# Patient Record
Sex: MALE | Race: WHITE | NOT HISPANIC OR LATINO | Employment: UNEMPLOYED | URBAN - METROPOLITAN AREA
[De-identification: names, ages, dates, MRNs, and addresses within clinical notes are randomized per-mention and may not be internally consistent; named-entity substitution may affect disease eponyms.]

---

## 2020-06-17 VITALS
SYSTOLIC BLOOD PRESSURE: 130 MMHG | DIASTOLIC BLOOD PRESSURE: 81 MMHG | TEMPERATURE: 98.1 F | HEIGHT: 68 IN | BODY MASS INDEX: 29.64 KG/M2 | HEART RATE: 100 BPM | WEIGHT: 195.6 LBS

## 2020-06-17 DIAGNOSIS — S83.512A TEARS OF MENISCUS AND ACL OF LEFT KNEE, INITIAL ENCOUNTER: Primary | ICD-10-CM

## 2020-06-17 DIAGNOSIS — S83.207A TEARS OF MENISCUS AND ACL OF LEFT KNEE, INITIAL ENCOUNTER: Primary | ICD-10-CM

## 2020-06-17 PROCEDURE — 99244 OFF/OP CNSLTJ NEW/EST MOD 40: CPT | Performed by: ORTHOPAEDIC SURGERY
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
## 2020-06-28 DIAGNOSIS — S83.207A TEARS OF MENISCUS AND ACL OF LEFT KNEE, INITIAL ENCOUNTER: ICD-10-CM

## 2020-06-28 DIAGNOSIS — Z11.59 SCREENING FOR VIRAL DISEASE: ICD-10-CM

## 2020-06-28 DIAGNOSIS — S83.512A TEARS OF MENISCUS AND ACL OF LEFT KNEE, INITIAL ENCOUNTER: ICD-10-CM

## 2020-06-28 PROCEDURE — U0003 INFECTIOUS AGENT DETECTION BY NUCLEIC ACID (DNA OR RNA); SEVERE ACUTE RESPIRATORY SYNDROME CORONAVIRUS 2 (SARS-COV-2) (CORONAVIRUS DISEASE [COVID-19]), AMPLIFIED PROBE TECHNIQUE, MAKING USE OF HIGH THROUGHPUT TECHNOLOGIES AS DESCRIBED BY CMS-2020-01-R: HCPCS

## 2020-06-30 RX ORDER — ACETAMINOPHEN 325 MG/1
650 TABLET ORAL EVERY 6 HOURS PRN
COMMUNITY
End: 2020-12-11

## 2020-06-30 NOTE — PRE-PROCEDURE INSTRUCTIONS
My Surgical Experience    The following information was developed to assist you to prepare for your operation  What do I need to do before coming to the hospital?   Arrange for a responsible person to drive you to and from the hospital    Arrange care for your children at home  Children are not allowed in the recovery areas of the hospital   Plan to wear clothing that is easy to put on and take off  If you are having shoulder surgery, wear a shirt that buttons or zippers in the front  Bathing  o Shower the evening before and the morning of your surgery with an antibacterial soap  Please refer to the Pre Op Showering Instructions for Surgery Patients Sheet   o Remove nail polish and all body piercing jewelry  o Do not shave any body part for at least 24 hours before surgery-this includes face, arms, legs and upper body  Food  o Nothing to eat or drink after midnight the night before your surgery  This includes candy and chewing gum  o Exception: If your surgery is after 12:00pm (noon), you may have clear liquids such as 7-Up®, ginger ale, apple or cranberry juice, Jell-O®, water, or clear broth until 8:00 am  o Do not drink milk or juice with pulp on the morning before surgery  o Do not drink alcohol 24 hours before surgery  Medicine  o Follow instructions you received from your surgeon about which medicines you may take on the day of surgery  o If instructed to take medicine on the morning of surgery, take pills with just a small sip of water  Call your prescribing doctor for specific infroamtion on what to do if you take insulin    What should I bring to the hospital?    Bring:  Floydene Amour or a walker, if you have them, for foot or knee surgery   A list of the daily medicines, vitamins, minerals, herbals and nutritional supplements you take   Include the dosages of medicines and the time you take them each day   Glasses, dentures or hearing aids   Minimal clothing; you will be wearing hospital sleepwear   Photo ID; required to verify your identity   If you have a Living Will or Power of , bring a copy of the documents   If you have an ostomy, bring an extra pouch and any supplies you use    Do not bring   Medicines or inhalers   Money, valuables or jewelry    What other information should I know about the day of surgery?  Notify your surgeons if you develop a cold, sore throat, cough, fever, rash or any other illness   Report to the Ambulatory Surgical/Same Day Surgery Unit   You will be instructed to stop at Registration only if you have not been pre-registered   Inform your  fi they do not stay that they will be asked by the staff to leave a phone number where they can be reached   Be available to be reached before surgery  In the event the operating room schedule changes, you may be asked to come in earlier or later than expected    *It is important to tell your doctor and others involved in your health care if you are taking or have been taking any non-prescription drugs, vitamins, minerals, herbals or other nutritional supplements  Any of these may interact with some food or medicines and cause a reaction      Pre-Surgery Instructions:   Medication Instructions    acetaminophen (TYLENOL) 325 mg tablet Instructed patient per Anesthesia Guidelines

## 2020-07-01 ENCOUNTER — ANESTHESIA EVENT (OUTPATIENT)
Dept: PERIOP | Facility: HOSPITAL | Age: 25
End: 2020-07-01
Payer: COMMERCIAL

## 2020-07-01 LAB — SARS-COV-2 RNA SPEC QL NAA+PROBE: NOT DETECTED

## 2020-07-02 ENCOUNTER — ANESTHESIA (OUTPATIENT)
Dept: PERIOP | Facility: HOSPITAL | Age: 25
End: 2020-07-02
Payer: COMMERCIAL

## 2020-07-02 ENCOUNTER — HOSPITAL ENCOUNTER (OUTPATIENT)
Facility: HOSPITAL | Age: 25
Setting detail: OUTPATIENT SURGERY
Discharge: HOME | End: 2020-07-02
Attending: ORTHOPAEDIC SURGERY | Admitting: ORTHOPAEDIC SURGERY
Payer: COMMERCIAL

## 2020-07-02 VITALS
DIASTOLIC BLOOD PRESSURE: 78 MMHG | BODY MASS INDEX: 29.13 KG/M2 | OXYGEN SATURATION: 95 % | TEMPERATURE: 97.6 F | SYSTOLIC BLOOD PRESSURE: 145 MMHG | RESPIRATION RATE: 18 BRPM | WEIGHT: 192.2 LBS | HEART RATE: 100 BPM | HEIGHT: 68 IN

## 2020-07-02 DIAGNOSIS — S83.207D TEARS OF MENISCUS AND ACL OF LEFT KNEE, SUBSEQUENT ENCOUNTER: Primary | ICD-10-CM

## 2020-07-02 DIAGNOSIS — S83.512D TEARS OF MENISCUS AND ACL OF LEFT KNEE, SUBSEQUENT ENCOUNTER: Primary | ICD-10-CM

## 2020-07-02 PROCEDURE — 29881 ARTHRS KNE SRG MNISECTMY M/L: CPT | Performed by: ORTHOPAEDIC SURGERY

## 2020-07-02 PROCEDURE — 29881 ARTHRS KNE SRG MNISECTMY M/L: CPT | Performed by: PHYSICIAN ASSISTANT

## 2020-07-02 PROCEDURE — C9290 INJ, BUPIVACAINE LIPOSOME: HCPCS | Performed by: ANESTHESIOLOGY

## 2020-07-02 PROCEDURE — 29888 ARTHRS AID ACL RPR/AGMNTJ: CPT | Performed by: ORTHOPAEDIC SURGERY

## 2020-07-02 PROCEDURE — C1713 ANCHOR/SCREW BN/BN,TIS/BN: HCPCS | Performed by: ORTHOPAEDIC SURGERY

## 2020-07-02 PROCEDURE — 29888 ARTHRS AID ACL RPR/AGMNTJ: CPT | Performed by: PHYSICIAN ASSISTANT

## 2020-07-02 DEVICE — TIGHTROPE ABS BUTTON RND CONCAVE 14MM: Type: IMPLANTABLE DEVICE | Site: KNEE | Status: FUNCTIONAL

## 2020-07-02 DEVICE — IMPLANT TIGHTROPE ACL W/FIBERTAG RT: Type: IMPLANTABLE DEVICE | Site: KNEE | Status: FUNCTIONAL

## 2020-07-02 DEVICE — IMPLANT TIGHTROPE ACL W/FIBERTAG ABS: Type: IMPLANTABLE DEVICE | Site: KNEE | Status: FUNCTIONAL

## 2020-07-02 RX ORDER — CEFAZOLIN SODIUM 2 G/50ML
SOLUTION INTRAVENOUS AS NEEDED
Status: DISCONTINUED | OUTPATIENT
Start: 2020-07-02 | End: 2020-07-02 | Stop reason: SURG

## 2020-07-02 RX ORDER — ONDANSETRON 2 MG/ML
4 INJECTION INTRAMUSCULAR; INTRAVENOUS ONCE AS NEEDED
Status: DISCONTINUED | OUTPATIENT
Start: 2020-07-02 | End: 2020-07-02 | Stop reason: HOSPADM

## 2020-07-02 RX ORDER — CEFAZOLIN SODIUM 2 G/50ML
2000 SOLUTION INTRAVENOUS ONCE
Status: DISCONTINUED | OUTPATIENT
Start: 2020-07-02 | End: 2020-07-02 | Stop reason: HOSPADM

## 2020-07-02 RX ORDER — ONDANSETRON 2 MG/ML
INJECTION INTRAMUSCULAR; INTRAVENOUS AS NEEDED
Status: DISCONTINUED | OUTPATIENT
Start: 2020-07-02 | End: 2020-07-02 | Stop reason: SURG

## 2020-07-02 RX ORDER — DEXAMETHASONE SODIUM PHOSPHATE 10 MG/ML
INJECTION, SOLUTION INTRAMUSCULAR; INTRAVENOUS AS NEEDED
Status: DISCONTINUED | OUTPATIENT
Start: 2020-07-02 | End: 2020-07-02 | Stop reason: SURG

## 2020-07-02 RX ORDER — OXYCODONE HYDROCHLORIDE AND ACETAMINOPHEN 5; 325 MG/1; MG/1
1 TABLET ORAL EVERY 4 HOURS PRN
Qty: 15 TABLET | Refills: 0 | Status: SHIPPED | OUTPATIENT
Start: 2020-07-02 | End: 2020-12-11

## 2020-07-02 RX ORDER — BUPIVACAINE HYDROCHLORIDE 2.5 MG/ML
INJECTION, SOLUTION EPIDURAL; INFILTRATION; INTRACAUDAL
Status: DISCONTINUED | OUTPATIENT
Start: 2020-07-02 | End: 2020-07-02 | Stop reason: SURG

## 2020-07-02 RX ORDER — FENTANYL CITRATE 50 UG/ML
INJECTION, SOLUTION INTRAMUSCULAR; INTRAVENOUS AS NEEDED
Status: DISCONTINUED | OUTPATIENT
Start: 2020-07-02 | End: 2020-07-02 | Stop reason: SURG

## 2020-07-02 RX ORDER — HYDROMORPHONE HCL/PF 1 MG/ML
0.2 SYRINGE (ML) INJECTION
Status: DISCONTINUED | OUTPATIENT
Start: 2020-07-02 | End: 2020-07-02 | Stop reason: HOSPADM

## 2020-07-02 RX ORDER — CEPHALEXIN 500 MG/1
500 CAPSULE ORAL EVERY 6 HOURS SCHEDULED
Qty: 4 CAPSULE | Refills: 0 | Status: SHIPPED | OUTPATIENT
Start: 2020-07-02 | End: 2020-07-03

## 2020-07-02 RX ORDER — MAGNESIUM HYDROXIDE 1200 MG/15ML
LIQUID ORAL AS NEEDED
Status: DISCONTINUED | OUTPATIENT
Start: 2020-07-02 | End: 2020-07-02 | Stop reason: HOSPADM

## 2020-07-02 RX ORDER — KETOROLAC TROMETHAMINE 30 MG/ML
INJECTION, SOLUTION INTRAMUSCULAR; INTRAVENOUS AS NEEDED
Status: DISCONTINUED | OUTPATIENT
Start: 2020-07-02 | End: 2020-07-02 | Stop reason: SURG

## 2020-07-02 RX ORDER — MIDAZOLAM HYDROCHLORIDE 2 MG/2ML
INJECTION, SOLUTION INTRAMUSCULAR; INTRAVENOUS AS NEEDED
Status: DISCONTINUED | OUTPATIENT
Start: 2020-07-02 | End: 2020-07-02 | Stop reason: SURG

## 2020-07-02 RX ORDER — PROPOFOL 10 MG/ML
INJECTION, EMULSION INTRAVENOUS AS NEEDED
Status: DISCONTINUED | OUTPATIENT
Start: 2020-07-02 | End: 2020-07-02 | Stop reason: SURG

## 2020-07-02 RX ORDER — LIDOCAINE HYDROCHLORIDE 10 MG/ML
INJECTION, SOLUTION EPIDURAL; INFILTRATION; INTRACAUDAL; PERINEURAL AS NEEDED
Status: DISCONTINUED | OUTPATIENT
Start: 2020-07-02 | End: 2020-07-02 | Stop reason: SURG

## 2020-07-02 RX ORDER — MEPERIDINE HYDROCHLORIDE 25 MG/ML
12.5 INJECTION INTRAMUSCULAR; INTRAVENOUS; SUBCUTANEOUS ONCE
Status: COMPLETED | OUTPATIENT
Start: 2020-07-02 | End: 2020-07-02

## 2020-07-02 RX ORDER — KETAMINE HCL IN NACL, ISO-OSM 100MG/10ML
SYRINGE (ML) INJECTION AS NEEDED
Status: DISCONTINUED | OUTPATIENT
Start: 2020-07-02 | End: 2020-07-02 | Stop reason: SURG

## 2020-07-02 RX ORDER — SODIUM CHLORIDE, SODIUM LACTATE, POTASSIUM CHLORIDE, CALCIUM CHLORIDE 600; 310; 30; 20 MG/100ML; MG/100ML; MG/100ML; MG/100ML
125 INJECTION, SOLUTION INTRAVENOUS CONTINUOUS
Status: DISCONTINUED | OUTPATIENT
Start: 2020-07-02 | End: 2020-07-02 | Stop reason: HOSPADM

## 2020-07-02 RX ORDER — GLYCOPYRROLATE 0.2 MG/ML
INJECTION INTRAMUSCULAR; INTRAVENOUS AS NEEDED
Status: DISCONTINUED | OUTPATIENT
Start: 2020-07-02 | End: 2020-07-02 | Stop reason: SURG

## 2020-07-02 RX ORDER — FENTANYL CITRATE/PF 50 MCG/ML
50 SYRINGE (ML) INJECTION
Status: DISCONTINUED | OUTPATIENT
Start: 2020-07-02 | End: 2020-07-02 | Stop reason: HOSPADM

## 2020-07-02 RX ORDER — PROPOFOL 10 MG/ML
INJECTION, EMULSION INTRAVENOUS CONTINUOUS PRN
Status: DISCONTINUED | OUTPATIENT
Start: 2020-07-02 | End: 2020-07-02 | Stop reason: SURG

## 2020-07-02 RX ADMIN — Medication 20 MG: at 11:28

## 2020-07-02 RX ADMIN — KETOROLAC TROMETHAMINE 30 MG: 30 INJECTION, SOLUTION INTRAMUSCULAR at 13:00

## 2020-07-02 RX ADMIN — SODIUM CHLORIDE, SODIUM LACTATE, POTASSIUM CHLORIDE, AND CALCIUM CHLORIDE: .6; .31; .03; .02 INJECTION, SOLUTION INTRAVENOUS at 12:46

## 2020-07-02 RX ADMIN — LIDOCAINE HYDROCHLORIDE 50 MG: 10 INJECTION, SOLUTION EPIDURAL; INFILTRATION; INTRACAUDAL; PERINEURAL at 11:06

## 2020-07-02 RX ADMIN — SODIUM CHLORIDE, SODIUM LACTATE, POTASSIUM CHLORIDE, AND CALCIUM CHLORIDE 125 ML/HR: .6; .31; .03; .02 INJECTION, SOLUTION INTRAVENOUS at 10:41

## 2020-07-02 RX ADMIN — MEPERIDINE HYDROCHLORIDE 12.5 MG: 25 INJECTION, SOLUTION INTRAMUSCULAR; INTRAVENOUS; SUBCUTANEOUS at 13:43

## 2020-07-02 RX ADMIN — FENTANYL CITRATE 50 MCG: 50 INJECTION, SOLUTION INTRAMUSCULAR; INTRAVENOUS at 13:08

## 2020-07-02 RX ADMIN — MIDAZOLAM HYDROCHLORIDE 1 MG: 1 INJECTION, SOLUTION INTRAMUSCULAR; INTRAVENOUS at 10:57

## 2020-07-02 RX ADMIN — FENTANYL CITRATE 50 MCG: 50 INJECTION, SOLUTION INTRAMUSCULAR; INTRAVENOUS at 11:24

## 2020-07-02 RX ADMIN — GLYCOPYRROLATE 0.1 MG: 0.2 INJECTION, SOLUTION INTRAMUSCULAR; INTRAVENOUS at 11:05

## 2020-07-02 RX ADMIN — PROPOFOL 100 MG: 10 INJECTION, EMULSION INTRAVENOUS at 11:08

## 2020-07-02 RX ADMIN — PROPOFOL 100 MCG/KG/MIN: 10 INJECTION, EMULSION INTRAVENOUS at 11:10

## 2020-07-02 RX ADMIN — CEFAZOLIN SODIUM 2000 MG: 2 SOLUTION INTRAVENOUS at 11:01

## 2020-07-02 RX ADMIN — ONDANSETRON 4 MG: 2 INJECTION INTRAMUSCULAR; INTRAVENOUS at 11:41

## 2020-07-02 RX ADMIN — Medication 20 MG: at 11:06

## 2020-07-02 RX ADMIN — Medication 10 MG: at 11:56

## 2020-07-02 RX ADMIN — MIDAZOLAM HYDROCHLORIDE 1 MG: 1 INJECTION, SOLUTION INTRAMUSCULAR; INTRAVENOUS at 10:52

## 2020-07-02 RX ADMIN — PROPOFOL 200 MG: 10 INJECTION, EMULSION INTRAVENOUS at 11:06

## 2020-07-02 RX ADMIN — DEXAMETHASONE SODIUM PHOSPHATE 4 MG: 10 INJECTION, SOLUTION INTRAMUSCULAR; INTRAVENOUS at 11:06

## 2020-07-02 RX ADMIN — FENTANYL CITRATE 50 MCG: 50 INJECTION, SOLUTION INTRAMUSCULAR; INTRAVENOUS at 10:52

## 2020-07-02 RX ADMIN — PROPOFOL 100 MG: 10 INJECTION, EMULSION INTRAVENOUS at 11:07

## 2020-07-02 RX ADMIN — BUPIVACAINE HYDROCHLORIDE 10 ML: 2.5 INJECTION, SOLUTION EPIDURAL; INFILTRATION; INTRACAUDAL at 14:13

## 2020-07-02 NOTE — OP NOTE
OPERATIVE REPORT  PATIENT NAME: Elba Girard    :  1995  MRN: [id number]  Pt Location: [facility] OR ROOM 01    SURGERY DATE: 2020    Surgeon(s) and Role:     * Jenelle Abarca MD - Primary     * Lamont Friend PA-C - Assisting necessary for the procedure for assistance with minimally invasive arthroscopic techniques for ACL reconstruction and medial meniscectomy for assistance with graft harvesting as well as assistance with graft preparation and implantation well as assistance with medial meniscectomy for assistance with use of the camera and shaver and biter  Zay LUNDBERG  And OTC 2nd assistant    Preop Diagnosis:  Tears of meniscus and ACL of left knee, initial encounter [S83 207A, S83 512A]    Post-Op Diagnosis Codes:     * Tears of meniscus and ACL of left knee, initial encounter [S83 207A, S83 512A]    Procedure:  Left knee arthroscopy with anterior cruciate ligament reconstruction utilizing quadriceps tendon autograft and all inside technique from [employer] with tight rope and 2 buttons as well as medial meniscectomy    Specimen(s):  * No specimens in log *    Estimated Blood Loss:   70 mL    Drains:  * No LDAs found *    Anesthesia Type:   General w/ Regional    Operative Indications:  Tears of meniscus and ACL of left knee, initial encounter [S83 207A, S83 512A]  Mojgan is a 31-year-old male who has been suffering with left knee pain and instability  This has been chronic  He had a more recent injury that gave him more significant pain  He believes he has had a chronic ACL tear  An MRI did demonstrate a complete ACL tear as well as a complex medial meniscus tear  He understood the risks and benefits of left knee arthroscopy with ACL reconstruction utilizing quadriceps tendon autograft and medial meniscectomy and wished to go ahead    The risks are inclusive of but not limited to infection, stiffness, recurrent instability, blood clots, failure to regain full strength and ability, arthritis, failure of the operation provide anticipated results, and need for further surgery  Operative Findings:  Left knee exam under anesthesia demonstrated significant instability to Lachman's as well as anterior drawer being both grade 2A  Good stability to posterior drawer well as good stability to varus valgus stress range of motion 0-130 preoperatively  There was a significant effusion however and intra-articularly there were grade 3 changes along undersurface of patella but no loose bodies in either gutter  Lateral compartment was intact from articular cartilage and meniscal standpoint however the medial compartment demonstrated highly complex bucket-handle tear with significant tearing of the remnant meniscus in a broad region  There were radial and horizontal cleavage and longitudinal components  We did take a considerable amount of time to appropriately debride these tears back to a stable rim of meniscal tissue  The meniscus was severely damaged over this chronic instability course  There were also grade 3 changes along the medial femoral condyle  The ACL was completely disrupted off of the femur and had an empty wall sign with very little ligament remaining  We were able to have an anatomic reconstruction and had a very stable 9 mm graft at the end of the procedure with excellent range of motion and no impingement and good stability to Lachman's and anterior drawer and probing  Complications:   None    Procedure and Technique:  Mojgan was taken to the operating room and placed supine on the OR table  He was given preoperative IV antibiotics as well as preoperative regional anesthesia by the attending anesthesiologist   General anesthesia was induced in the left knee was taken through exam under anesthesia as described above  The left lower extremity was prepped and draped in usual sterile fashion  Surgical time-out was taken  We exsanguinated and inflated tourniquet    First tourniquet time was 16 minutes  We began with a longitudinal incision over the quadriceps tendon in its mid section with a 15 blade and carefully dissected down to the quadriceps tendon  The 9 mm blade was utilized to harvest the middle 3rd of the quadriceps tendon in standard fashion  We then whipstitched the graft with a FiberWire suture and were then able to complete the harvest utilizing the quadriceps tendon harvester device gaining approximately 70 mm of tendon  We placed the tendon on the back table and closed the interval of the quadriceps tendon with 0 Vicryl suture interrupted  We then prepared the graft on the back table on the graft master in standard fashion as the tourniquet was let down for the 1st time  We did have a 9 mm graft when measured on the back table that was 70 mm in length  We prepared in standard fashion for the quadriceps tendon autograft ACL reconstruction with the tight rope  We then turned our attention back to the knee for the arthroscopic portion of the procedure and exsanguinated once again and inflated the tourniquet  We did create an anterolateral portal with 11 blade and began diagnostic arthroscopy  There was a significant effusion in the knee  We also made an anteromedial portal with 11 blade and dilated that the fit the size 9 graft  We demonstrated articular cartilage damage only undersurface of the patella which was grade 3 but no loose bodies in either gutter  Lateral compartment was intact from articular cartilage and meniscal standpoint  ACL was obviously completely disrupted off the femur with very little remnant tissue remaining on the tibia  We also demonstrated a large bucket-handle and highly complex medial meniscus tear with meniscal remnant tears that were considerable and radial as well as horizontal cleavage in nature    We did take a considerable amount of time to perform a medial meniscectomy utilizing a series of biters and Santiago to carefully remove torn meniscal tissue but leave intact meniscal tissue  The posterior 3rd of the tear was certainly the worst portion of the tear although the meniscal root was intact posteriorly  We felt that we had a stable rim of meniscal tissue remaining at the end of this portion of the procedure  We had switched back and forth viewing from the anterolateral and anteromedial portals to gain better access the different portions of this highly complex and large tear pattern  We then turned our attention to preparation for placement of the ACL reconstruction graft  We did remove tissue along the tibia and femur where there was some small amount of remaining tissue left for the ACL but did leave tissue to identify anatomic landmarks for placement the sockets  We viewed from the anteromedial portal and placed the femoral socket guide set at 105°  We then made a small incision along the distal lateral thigh  This is with 11 blade  We did place a guidewire followed by the 9 mm reverse Reamer into the anatomic stump of the ACL on the femur just posterior to the midline along the lateral femoral condyle in the notch  We reverse reamed to a depth of approximately 28 mm  We did remove any floating debris from the joint with a shaver and then passed the FiberWire suture and retrieved that via the anterolateral portal and docked it there  We then viewed from the anterolateral portal and began preparation the tibial tunnel  We did make a small incision along the proximal anteromedial tibia with 11 blade and utilized the tibial tunnel guide set at 55°  We placed a guidewire in the center of the tibial stump in the anatomic location on the tibia in between the tibial spines along the anterior portion the lateral meniscus  We then placed the guidewire followed by the 9 mm reverse Reamer and reverse reamed through both cortices    We did remove any floating debris with a shaver and then passed the tiger wire suture and retrieved both the tiger wire and FiberWire sutures through the anteromedial portal   We then retrieved the graft on the back table and passed the femoral portion of the graft into the femoral socket delivering approximately 25 mm of graft into the femoral socket and observing the femoral button was flipped along the femur and sit very nicely  We then delivered the tibial portion of the graft into the tibial tunnel  This did sit very nicely  We ranged the knee and cycled approximately 20 times  We felt it was stable in its fixation on the femur  We then placed the knee into full extension and utilized the large tibial button and tensioned the graft with the tight rope sutures as well as the FiberWire sutures from preparation of the graft  We tied additional knots over the button with both sutures  We then tensioned the femoral portion once again and found that we were very stable on Lachman's as well as anterior drawer and probing of the ACL  After placing additional knots over femoral button, we removed excess suture and closed the percutaneous an arthroscopic portals utilizing 4-0 nylon suture and utilized 2-0 Vicryl and 4-0 Vicryl and skin glue for the quadriceps tendon harvest site  Dry, sterile dressings were applied with an Ace bandage and a brace in tourniquet was let down at 77 minutes  He tolerated transferred to recovery stable condition  He will follow up in approximately week for suture removal and will be on the ACL reconstruction rehabilitation protocol  He will be on aspirin for DVT prophylaxis  He will be on Keflex for 1 day postoperatively for antibiotic prophylaxis  He will have a CPM and quadriceps muscle stimulator       I was present for the entire procedure and A qualified resident physician was not available    Patient Disposition:  PACU     SIGNATURE: Norman Kam MD  DATE: July 2, 2020  TIME: 1:01 PM

## 2020-07-02 NOTE — ANESTHESIA POSTPROCEDURE EVALUATION
Post-Op Assessment Note    CV Status:  Stable  Pain Score: 0    Pain management: adequate     Mental Status:  Alert and awake   Hydration Status:  Euvolemic and stable   PONV Controlled:  Controlled   Airway Patency:  Patent   Post Op Vitals Reviewed: Yes      Staff: Anesthesiologist, CRNA   Comments: Report given to recopvering RN, VSS, Pt states he is comfortable          /72 (07/02/20 1325)    Temp (!) 97 4 °F (36 3 °C) (07/02/20 1325)    Pulse (!) 120 (07/02/20 1325)   Resp 16 (07/02/20 1325)    SpO2 99 % (07/02/20 1325)

## 2020-07-02 NOTE — INTERVAL H&P NOTE
H&P reviewed  After examining the patient I find no changes in the patients condition since the H&P had been written      Vitals:    07/02/20 1006   BP: 147/93   Pulse: (!) 116   Resp: 18   Temp: (!) 96 1 °F (35 6 °C)   SpO2: 99%

## 2020-07-02 NOTE — PERIOPERATIVE NURSING NOTE
Received from pacu  Awake and alert  Taking po fluids  Ice maintained to left knee  Left pedal pulse palpable  Parents at bedside

## 2020-07-02 NOTE — ANESTHESIA PREPROCEDURE EVALUATION
Review of Systems/Medical History  Patient summary reviewed  Chart reviewed      Cardiovascular  Negative cardio ROS    Pulmonary  Negative pulmonary ROS        GI/Hepatic  Negative GI/hepatic ROS          Negative  ROS        Endo/Other  Negative endo/other ROS      GYN  Negative gynecology ROS          Hematology  Negative hematology ROS      Musculoskeletal  Negative musculoskeletal ROS        Neurology  Negative neurology ROS      Psychology   Negative psychology ROS              Physical Exam    Airway    Mallampati score: II  TM Distance: >3 FB  Neck ROM: full     Dental   No notable dental hx     Cardiovascular  Comment: Negative ROS, Rhythm: regular, Rate: normal, Cardiovascular exam normal    Pulmonary  Pulmonary exam normal Breath sounds clear to auscultation,     Other Findings        Anesthesia Plan  ASA Score- 2     Anesthesia Type- regional and IV sedation with anesthesia with ASA Monitors  Additional Monitors:   Airway Plan:         Plan Factors-    Induction- intravenous  Postoperative Plan- Plan for postoperative opioid use  Informed Consent- Anesthetic plan and risks discussed with patient  I personally reviewed this patient with the CRNA  Discussed and agreed on the Anesthesia Plan with the CRNA  Pietro Messer

## 2020-07-02 NOTE — DISCHARGE INSTRUCTIONS
Instruction Following ACL Reconstruction    Crutches:  Use the crutches when walking as you were taught at the surgical center  Put as much weight on your leg as you can tolerate  When you feel comfortable walking without your crutches you may do so  This usually takes about 1-2 weeks  CPM (continuous passive motion) Machine:   You should use this machine for 2-3 hours at home twice daily for a total of 4-6 hours per day  The company will explain how to operate the CPM  Your goal with this machine is to achieve 95 degrees of flexion (bending) comfortably  Every time you use the CPM you should try to increase the flexion by 5 degrees  You will experience some discomfort while trying to increase your flexion  Once 95 degrees is obtained you may call the company to  the machine  Also remember to take off your brace while using it  Brace: The knee brace given to you immediately after surgery must by worn while walking and sleeping in locked extension (leg straight)  You may take the brace off when doing exercises and/or in the CPM machine  Brace hinges must be at the level of the knee cap  You may loosen or tighten the brace straps as necessary, but it should be snug  You will need to wear the brace for about 4-6 weeks  Dressing:   Remove all cotton and yellow gauze 48 hours after your surgery  If there are steri-strips (white paper strips) on your wound leave them in place until you see the doctor  Reapply ACE bandage or bandaids over incisions  You do not need to place a new dressing on your knee  Showering: You may shower 48 hours after surgery once the dressing has been removed, however you must place a plastic bag over the brace while showering or you have the option to take off the brace to shower  Whatever you decide to do please use CAUTION!! Be careful not to slip, twist, or fall  A stool placed in the shower so you can sit is a great idea so you can stabilize your knee   Do not soak in a bath tub, hot tub, or pool until the doctor tells you it is O K  to do so  Once you are done showering pat the wound dry  Elevation:   When you are not walking your leg should be straight with a pillow under your foot or ankle (not behind your knee)  Try to elevate knee as much as possible to reduce swelling  Ice:   You should ice the knee as often as possible (especially after exercise) to reduce swelling and discomfort  Do not ice the knee more than 20 minutes at a time  Let the knee warm up before reapplication  Avoid getting your wound wet  Aspirin:  Unless otherwise noted, take one 325mg aspirin daily for the first 3 weeks following surgery  This is to help decrease the risk of blood clots  Follow-up visit:   You need to see the doctor about one week following surgery for your first post-op visit  At that time your sutures (stitches) will be removed  You will be given a physical therapy prescription to begin physical therapy if you were not already given one  Common concerns: 1  Numbness around the incision site on the outside part of the knee is a result of a disruption of a superficial nerve during the operative procedure  Most of this will resolve over time but a small area the size of a quarter usually remains numb  This is unavoidable because of the proximity of the nerve to the incision  2  A sudden rush or feeling of fullness with pain when going from a sitting to a standing position in the knee is common after surgery  3  Bruising and/or swelling of the shin and ankle is common after surgery  This usually occurs 3-4 days after surgery  This is caused by bleeding from the bone (which is cut during surgery) into the area just below the skin  To relieve this discomfort it is best to ice the leg  If at any time you have discomfort, swelling, or redness in the calf (behind the leg between the knee and the ankle) please call the doctor immediately  Please call if:   1   If at any time you have discomfort, swelling, or redness in the calf (behind the leg between the knee and the ankle) please call the doctor immediately  2  Any oozing or redness of the wound, fevers (>101 3 degrees F), or chills  3  Any difficulty breathing or heaviness in the chest      REMEMBER - these are only guidelines for what to expect following ACL surgery  If you have any questions or concerns, please do not hesitate to call the office at any time [phone]

## 2020-07-02 NOTE — ANESTHESIA PROCEDURE NOTES
Peripheral Block    Patient location during procedure: pre-op  Start time: 7/2/2020 10:45 AM  Reason for block: procedure for pain, at surgeon's request and post-op pain management  Staffing  Anesthesiologist: Hilary Man MD  Performed: anesthesiologist   Preanesthetic Checklist  Completed: patient identified, site marked, surgical consent, pre-op evaluation, timeout performed, IV checked, risks and benefits discussed and monitors and equipment checked  Peripheral Block  Patient position: supine  Prep: ChloraPrep  Patient monitoring: heart rate, cardiac monitor, continuous pulse ox and frequent blood pressure checks  Block type: adductor canal block  Laterality: left  Injection technique: single-shot  Procedures: ultrasound guided, Ultrasound guidance required for the procedure to increase accuracy and safety of medication placement and decrease risk of complications    Ultrasound permanent image savedbupivacaine (MARCAINE) 0 25 % perineural infiltration, 10 mL (Exparel 20 ml)  Needle  Needle type: Stimuplex   Needle gauge: 22 G  Needle length: 5 cm  Needle localization: anatomical landmarks and ultrasound guidance  Needle insertion depth: 2 5 cm  Test dose: negative  Assessment  Injection assessment: incremental injection, local visualized surrounding nerve on ultrasound, negative aspiration for heme and no paresthesia on injection  Paresthesia pain: none  Heart rate change: no  Slow fractionated injection: yes  Post-procedure:  site cleaned  patient tolerated the procedure well with no immediate complications

## 2020-07-10 ENCOUNTER — OFFICE VISIT (OUTPATIENT)
Dept: OBGYN CLINIC | Facility: CLINIC | Age: 25
End: 2020-07-10

## 2020-07-10 VITALS
HEIGHT: 68 IN | TEMPERATURE: 96.6 F | DIASTOLIC BLOOD PRESSURE: 93 MMHG | WEIGHT: 190 LBS | SYSTOLIC BLOOD PRESSURE: 148 MMHG | BODY MASS INDEX: 28.79 KG/M2 | HEART RATE: 95 BPM

## 2020-07-10 DIAGNOSIS — Z98.890 S/P ACL RECONSTRUCTION: Primary | ICD-10-CM

## 2020-07-10 PROCEDURE — 99024 POSTOP FOLLOW-UP VISIT: CPT | Performed by: PHYSICIAN ASSISTANT

## 2020-07-10 NOTE — PROGRESS NOTES
Assessment/Plan:  1  S/P ACL reconstruction         The patient is doing well and will continue his brace locked in extension with weightbearing  He may unlock this while sitting  He will continue aspirin for DVT prophylaxis  He will call if he develops and calf pain or cramping as this could be indicative of DVT  He will continue his CPM and PT  He can wean from his crutches as tolerated  He will follow-up in 4 weeks for repeat evaluation  Subjective:   Aleyda Prado is a 25 y o  male who presents today for follow-up of his left knee, now about a week status post arthroscopic medial meniscectomy and ACL reconstruction with quadriceps tendon autograft  He notes improvement in his pain, but still notes some anterior and posterior knee pain  He has been ambulating with his brace locked in extension with use of his crutches for assistance  He is up to 62 degrees on his CPM  He has had 1 session of PT thus far  He denies any calf pain or cramping  He notes good sensation of the left lower extremity  Review of Systems      History reviewed  No pertinent past medical history      Past Surgical History:   Procedure Laterality Date    MI KNEE SCOPE,AID ANT CRUCIATE REPAIR Left 7/2/2020    Procedure: ARTHROSCOPIC RECONSTRUCTION ANTERIOR CRUCIATE LIGAMENT (ACL) WITH QUADRICEPS TENDON AUTOGRAFT AND MEDIAL MENISCAL REPAIR VERSUS MENISCECTOMY;  Surgeon: Pritesh Albright MD;  Location: [address] [place] [address];  Service: Orthopedics    MI KNEE SCOPE,MED OR LAT MENIS REPAIR Left 7/2/2020    Procedure: REPAIR MENISCUS;  Surgeon: Pritesh Albright MD;  Location: [facility] OR;  Service: Orthopedics    MI KNEE SCOPE,MED/LAT MENISECTOMY Left 7/2/2020    Procedure: Idalia Hollins;  Surgeon: Pritesh Albright MD;  Location: [facility];  Service: Orthopedics    WISDOM TOOTH EXTRACTION         Family History   Problem Relation Age of Onset    No Known Problems Mother     No Known Problems Father        Social History Occupational History    Not on file   Tobacco Use    Smoking status: Never Smoker    Smokeless tobacco: Never Used   Substance and Sexual Activity    Alcohol use: Never     Frequency: Never    Drug use: Never    Sexual activity: Not on file         Current Outpatient Medications:     acetaminophen (TYLENOL) 325 mg tablet, Take 650 mg by mouth every 6 (six) hours as needed for mild pain, Disp: , Rfl:     oxyCODONE-acetaminophen (PERCOCET) 5-325 mg per tablet, Take 1 tablet by mouth every 4 (four) hours as needed for moderate pain for up to 15 dosesMax Daily Amount: 6 tablets (Patient not taking: Reported on 7/10/2020), Disp: 15 tablet, Rfl: 0    No Known Allergies    Objective:  Vitals:    07/10/20 0902   BP: 148/93   Pulse: 95   Temp: (!) 96 6 °F (35 9 °C)       Ortho Exam    Physical Exam    Left knee: Sutures removed  Incisions CDI  No erythema  No calf or knee tenderness  ROM 0-50 degrees  Sensation intact

## 2020-07-21 NOTE — TELEPHONE ENCOUNTER
Mojgan called today asking for help with getting a refund for the polar cube  He has not been able to use the unit at all due to him not having the correct padding for it  I did send an email to Taye - our  - asking her to contact patient to discuss further with hopes to rectify this situation  I did speak with Mojgan and let him know that I would reach out to our rep to try to get some resolution

## 2020-08-07 VITALS — BODY MASS INDEX: 28.89 KG/M2 | HEIGHT: 68 IN | TEMPERATURE: 97.1 F

## 2020-08-07 DIAGNOSIS — Z98.890 S/P ACL RECONSTRUCTION: Primary | ICD-10-CM

## 2020-08-07 PROCEDURE — 99024 POSTOP FOLLOW-UP VISIT: CPT | Performed by: ORTHOPAEDIC SURGERY

## 2020-08-07 NOTE — PROGRESS NOTES
Assessment/Plan:  1  S/P ACL reconstruction         The patient is doing well and can discontinue his TROM brace at this point  He will continue PT on the ACL reconstruction protocol  He will still avoid any significant activity with his leg  He will follow-up in 6 weeks for repeat evaluation  Subjective:   Elba Girard is a 25 y o  male who presents today for follow-up of his left knee, now about 5 weeks status post ACL reconstruction with quadriceps tendon autograft and medial meniscectomy  He feels he is progressing with PT and notes improving ROM and strength  He notes good sensation of the left lower extremity  He denies any calf pain or cramping  Review of Systems      History reviewed  No pertinent past medical history      Past Surgical History:   Procedure Laterality Date    VA KNEE SCOPE,AID ANT CRUCIATE REPAIR Left 7/2/2020    Procedure: ARTHROSCOPIC RECONSTRUCTION ANTERIOR CRUCIATE LIGAMENT (ACL) WITH QUADRICEPS TENDON AUTOGRAFT AND MEDIAL MENISCAL REPAIR VERSUS MENISCECTOMY;  Surgeon: Jenelle Abarca MD;  Location: [address] [place] [address];  Service: Orthopedics    VA KNEE SCOPE,MED OR LAT [facility] Left 7/2/2020    Procedure: REPAIR MENISCUS;  Surgeon: Jenelle Abarca MD;  Location: [facility];  Service: Orthopedics    VA KNEE SCOPE,MED/LAT MENISECTOMY Left 7/2/2020    Procedure: Marco Orr;  Surgeon: Jenelle Abarca MD;  Location: [facility] OR;  Service: Orthopedics    WISDOM TOOTH EXTRACTION         Family History   Problem Relation Age of Onset    No Known Problems Mother     No Known Problems Father        Social History     Occupational History    Not on file   Tobacco Use    Smoking status: Never Smoker    Smokeless tobacco: Never Used   Substance and Sexual Activity    Alcohol use: Never     Frequency: Never    Drug use: Never    Sexual activity: Not on file         Current Outpatient Medications:     acetaminophen (TYLENOL) 325 mg tablet, Take 650 mg by mouth every 6 (six) hours as needed for mild pain, Disp: , Rfl:     oxyCODONE-acetaminophen (PERCOCET) 5-325 mg per tablet, Take 1 tablet by mouth every 4 (four) hours as needed for moderate pain for up to 15 dosesMax Daily Amount: 6 tablets (Patient not taking: Reported on 7/10/2020), Disp: 15 tablet, Rfl: 0    No Known Allergies    Objective:  Vitals:    08/07/20 0915   Temp: (!) 97 1 °F (36 2 °C)       Right Knee Exam     Tenderness   The patient is experiencing no tenderness  Range of Motion   Extension: 0   Flexion: 110     Tests   Lachman:  Anterior - negative      Drawer:  Anterior - negative    Posterior - negative    Other   Erythema: absent  Scars: present  Sensation: normal  Pulse: present  Swelling: none  Effusion: no effusion present          Observations     Right Knee   Negative for effusion  Physical Exam   Musculoskeletal:      Right knee: He exhibits no effusion

## 2020-09-18 VITALS
SYSTOLIC BLOOD PRESSURE: 125 MMHG | BODY MASS INDEX: 28.89 KG/M2 | TEMPERATURE: 96.7 F | DIASTOLIC BLOOD PRESSURE: 84 MMHG | HEIGHT: 68 IN | HEART RATE: 75 BPM

## 2020-09-18 DIAGNOSIS — S83.512A TEARS OF MENISCUS AND ACL OF LEFT KNEE, INITIAL ENCOUNTER: ICD-10-CM

## 2020-09-18 DIAGNOSIS — S83.207A TEARS OF MENISCUS AND ACL OF LEFT KNEE, INITIAL ENCOUNTER: ICD-10-CM

## 2020-09-18 DIAGNOSIS — Z98.890 S/P ACL RECONSTRUCTION: Primary | ICD-10-CM

## 2020-09-18 PROCEDURE — 99024 POSTOP FOLLOW-UP VISIT: CPT | Performed by: ORTHOPAEDIC SURGERY

## 2020-09-18 NOTE — PROGRESS NOTES
Assessment/Plan:  1  S/P ACL reconstruction  Ambulatory referral to Physical Therapy   2  Tears of meniscus and ACL of left knee, initial encounter  Ambulatory referral to Physical Therapy       Scribe Attestation    I,:   Sebastian Balderrama am acting as a scribe while in the presence of the attending physician :        I,:   Meryl Santamaria MD personally performed the services described in this documentation    as scribed in my presence :              Mojgan upon examination is doing well to have months status post left knee arthroscopy with anterior cruciate ligament reconstruction, utilizing quadriceps autograft, and medial meniscectomy  He demonstrates full range of motion and great stability on exam today  I did remark that the snapping at the lateral aspect of his knee is likely inflammation and should continue to improve as time goes on as well as the intermittent cracking medially  I did note to him that I would like him to refrain from any light jogging until at least 3 months status post   He may continue with his leg press and hamstring curl activities  No running activities for at least 1 more month  I would like him to continue with the anterior cruciate ligament reconstruction protocol with physical therapy  I did provide him a new prescription indicating this today  In regards to the scars from surgery he may use [employer] skin care 3 to 4 times a day over the next 3-4 months  I would like him to follow up with me in 6 weeks time for repeat clinical evaluation  Subjective:   Ankit King is a 25 y o  male who presents to the office today for follow-up evaluation of his left knee  He is now approximately 2 and half months status post left knee arthroscopy with anterior cruciate ligament reconstruction utilizing quadriceps autograft, and medial meniscectomy  States he is doing well overall and is only experiencing mild moderate sharp pain at the patellar tendon    He states that physical therapy is going well however it was quite difficult at the initial onset  States he denies any recurrent swelling, or bouts of instability into his knee  He does remark on intermittent cracking to the medial aspect of his knee however it is nonpainful and does not inhibit his range of motion or stability into his knee  He does also have complaints of intermittent snapping sensation at the lateral aspect of his knee at the lateral incision  Again he denies that it is painful however does remark on it during flexion extension exercises  Today he denies any distal paresthesias  Review of Systems   Constitutional: Negative for chills, fever and unexpected weight change  HENT: Negative for hearing loss, nosebleeds and sore throat  Eyes: Negative for pain, redness and visual disturbance  Respiratory: Negative for cough, shortness of breath and wheezing  Cardiovascular: Negative for chest pain, palpitations and leg swelling  Gastrointestinal: Negative for abdominal pain, nausea and vomiting  Endocrine: Negative for polyphagia and polyuria  Genitourinary: Negative for dysuria and hematuria  Musculoskeletal:        See HPI   Skin: Negative for rash and wound  Neurological: Negative for dizziness, numbness and headaches  Psychiatric/Behavioral: Negative for decreased concentration and suicidal ideas  The patient is not nervous/anxious  History reviewed  No pertinent past medical history      Past Surgical History:   Procedure Laterality Date    DE KNEE SCOPE,AID ANT CRUCIATE REPAIR Left 7/2/2020    Procedure: ARTHROSCOPIC RECONSTRUCTION ANTERIOR CRUCIATE LIGAMENT (ACL) WITH QUADRICEPS TENDON AUTOGRAFT AND MEDIAL MENISCAL REPAIR VERSUS MENISCECTOMY;  Surgeon: Janey Monroy MD;  Location: [address] [place] [address];  Service: Orthopedics    DE KNEE SCOPE,MED OR LAT MENIS REPAIR Left 7/2/2020    Procedure: REPAIR MENISCUS;  Surgeon: Janey Monroy MD;  Location: [address] [place] [address];  Service: Orthopedics    [place] KNEE SCOPE,MED/LAT MENISECTOMY Left 7/2/2020    Procedure: Nory Au;  Surgeon: Monalisa Avelar MD;  [facility];  Service: Orthopedics    WISDOM TOOTH EXTRACTION         Family History   Problem Relation Age of Onset    No Known Problems Mother     No Known Problems Father        Social History     Occupational History    Not on file   Tobacco Use    Smoking status: Never Smoker    Smokeless tobacco: Never Used   Substance and Sexual Activity    Alcohol use: Never     Frequency: Never    Drug use: Never    Sexual activity: Not on file         Current Outpatient Medications:     acetaminophen (TYLENOL) 325 mg tablet, Take 650 mg by mouth every 6 (six) hours as needed for mild pain, Disp: , Rfl:     oxyCODONE-acetaminophen (PERCOCET) 5-325 mg per tablet, Take 1 tablet by mouth every 4 (four) hours as needed for moderate pain for up to 15 dosesMax Daily Amount: 6 tablets (Patient not taking: Reported on 7/10/2020), Disp: 15 tablet, Rfl: 0    No Known Allergies    Objective:  Vitals:    09/18/20 0909   BP: 125/84   Pulse: 75   Temp: (!) 96 7 °F (35 9 °C)       Left Knee Exam     Tenderness   The patient is experiencing tenderness in the patellar tendon  Range of Motion   Extension: 0   Flexion: 140     Tests   Varus: negative Valgus: negative  Drawer:  Anterior - negative     Posterior - negative    Other   Erythema: absent  Scars: present (Quadriceps incision scar is well healed, as well as at the anterior proximal tibia, and lateral distal thigh)  Sensation: normal  Pulse: present  Swelling: none  Effusion: no effusion present    Comments:  Mild clicking of the distal IT band          Observations   Left Knee   Negative for effusion  Physical Exam  Vitals signs reviewed  Constitutional:       Appearance: He is well-developed  HENT:      Head: Normocephalic and atraumatic  Eyes:      General:         Right eye: No discharge  Left eye: No discharge  Conjunctiva/sclera: Conjunctivae normal    Neck:      Musculoskeletal: Normal range of motion and neck supple  Cardiovascular:      Rate and Rhythm: Normal rate  Pulmonary:      Effort: Pulmonary effort is normal  No respiratory distress  Musculoskeletal:      Left knee: He exhibits no effusion  Skin:     General: Skin is warm and dry  Neurological:      Mental Status: He is alert and oriented to person, place, and time  Psychiatric:         Behavior: Behavior normal          Thought Content:  Thought content normal          Judgment: Judgment normal

## 2020-10-30 VITALS — HEIGHT: 68 IN | BODY MASS INDEX: 28.79 KG/M2 | WEIGHT: 190 LBS

## 2020-10-30 DIAGNOSIS — Z98.890 S/P ACL RECONSTRUCTION: Primary | ICD-10-CM

## 2020-10-30 DIAGNOSIS — Z98.890 STATUS POST MEDIAL MENISCECTOMY OF LEFT KNEE: ICD-10-CM

## 2020-10-30 PROCEDURE — 99213 OFFICE O/P EST LOW 20 MIN: CPT | Performed by: ORTHOPAEDIC SURGERY

## 2020-12-11 VITALS
SYSTOLIC BLOOD PRESSURE: 138 MMHG | HEIGHT: 68 IN | DIASTOLIC BLOOD PRESSURE: 92 MMHG | HEART RATE: 85 BPM | BODY MASS INDEX: 29.7 KG/M2 | WEIGHT: 196 LBS

## 2020-12-11 DIAGNOSIS — Z98.890 S/P ACL RECONSTRUCTION: Primary | ICD-10-CM

## 2020-12-11 DIAGNOSIS — Z98.890 STATUS POST MEDIAL MENISCECTOMY OF LEFT KNEE: ICD-10-CM

## 2020-12-11 PROCEDURE — 99213 OFFICE O/P EST LOW 20 MIN: CPT | Performed by: ORTHOPAEDIC SURGERY

## 2021-01-22 VITALS
SYSTOLIC BLOOD PRESSURE: 129 MMHG | DIASTOLIC BLOOD PRESSURE: 79 MMHG | HEART RATE: 89 BPM | BODY MASS INDEX: 29.7 KG/M2 | HEIGHT: 68 IN | WEIGHT: 196 LBS

## 2021-01-22 DIAGNOSIS — Z98.890 S/P ACL RECONSTRUCTION: Primary | ICD-10-CM

## 2021-01-22 PROCEDURE — 99213 OFFICE O/P EST LOW 20 MIN: CPT | Performed by: ORTHOPAEDIC SURGERY

## 2021-01-22 NOTE — PROGRESS NOTES
Assessment/Plan:  1  S/P ACL reconstruction, left       The patient is doing well and will continue strengthening for his leg  We discussed [facility] once he is 9 months post operatively  We would like him to have comparable strength of the lower extremities prior to return to any sporting activities  He will follow-up as needed for this  Subjective:   Dahiana Rodriguez is a 22 y o  male who presents today for follow-up of his left knee, now about 7 months status post ACL reconstruction with quadriceps tendon autograft  He is doing well and denies any pain about the knee  HE notes good ROM and improving strength  He has been doing a lot of weight training with this leg  He does complain of some stiffness about the anterior knee after prolonged rest, however this improves once he gets moving  Review of Systems   Constitutional: Negative  Negative for chills and fever  HENT: Negative  Negative for ear pain and sore throat  Eyes: Negative  Negative for pain and redness  Respiratory: Negative  Negative for shortness of breath and wheezing  Cardiovascular: Negative for chest pain and palpitations  Gastrointestinal: Negative  Negative for abdominal pain and blood in stool  Endocrine: Negative  Negative for polydipsia and polyuria  Genitourinary: Negative  Negative for difficulty urinating and dysuria  Musculoskeletal:        As noted in HPI   Skin: Negative  Negative for pallor and rash  Neurological: Negative  Negative for dizziness and numbness  Hematological: Negative  Negative for adenopathy  Does not bruise/bleed easily  Psychiatric/Behavioral: Negative  Negative for confusion and suicidal ideas  History reviewed  No pertinent past medical history      Past Surgical History:   Procedure Laterality Date    MD KNEE SCOPE,AID ANT CRUCIATE REPAIR Left 7/2/2020    Procedure: ARTHROSCOPIC RECONSTRUCTION ANTERIOR CRUCIATE LIGAMENT (ACL) WITH QUADRICEPS TENDON AUTOGRAFT AND MEDIAL MENISCAL REPAIR VERSUS MENISCECTOMY;  Surgeon: Helder Oliver MD;  Location: [address] [place] [address];  Service: Orthopedics    [facility]    Procedure: REPAIR MENISCUS;  Surgeon: Helder Oliver MD;  Location: [facility];  Service: Orthopedics    OR KNEE SCOPE,MED/LAT MENISECTOMY Left 7/2/2020    Procedure: Clifm Ao;  Surgeon: Helder Oliver MD;  [facility]: [facility];  Service: Orthopedics    WISDOM TOOTH EXTRACTION         Family History   Problem Relation Age of Onset    No Known Problems Mother     No Known Problems Father        Social History     Occupational History    Not on file   Tobacco Use    Smoking status: Never Smoker    Smokeless tobacco: Never Used   Substance and Sexual Activity    Alcohol use: Never     Frequency: Never    Drug use: Never    Sexual activity: Not on file       No current outpatient medications on file  No Known Allergies    Objective:  Vitals:    01/22/21 1007   BP: 129/79   Pulse: 89       Left Knee Exam     Tenderness   The patient is experiencing no tenderness  Range of Motion   Extension:  0 normal   Flexion: 130     Tests   Varus: negative Valgus: negative  Lachman:  Anterior - negative      Drawer:  Anterior - negative     Posterior - negative    Other   Erythema: absent  Sensation: normal  Pulse: present  Swelling: none  Effusion: no effusion present          Observations   Left Knee   Negative for effusion  Physical Exam  Constitutional:       General: He is not in acute distress  Appearance: He is well-developed  HENT:      Head: Normocephalic and atraumatic  Eyes:      General: No scleral icterus  Conjunctiva/sclera: Conjunctivae normal    Neck:      Vascular: No JVD  Cardiovascular:      Rate and Rhythm: Normal rate  Pulmonary:      Effort: Pulmonary effort is normal  No respiratory distress     Musculoskeletal:      Left knee: He exhibits no effusion  Skin:     General: Skin is warm  Neurological:      Mental Status: He is alert and oriented to person, place, and time        Coordination: Coordination normal

## 2021-04-07 NOTE — TELEPHONE ENCOUNTER
Dr Asim Farrell    [phone]    Patient completed PT  He is requesting an order for a strength assessment      Please fax to [facility] @ [phone]

## 2021-04-08 NOTE — TELEPHONE ENCOUNTER
Can you please advise what type of order I need to enter do this? Do we put under PT for sports center

## 2021-04-08 NOTE — TELEPHONE ENCOUNTER
I called patient and advised he can call over to our [facility] to set up strength assessment, no ordered needed

## (undated) DEVICE — GLOVE INDICATOR PI UNDERGLOVE SZ 7.5 BLUE

## (undated) DEVICE — CHLORAPREP HI-LITE 26ML ORANGE

## (undated) DEVICE — OCCLUSIVE GAUZE STRIP,3% BISMUTH TRIBROMOPHENATE IN PETROLATUM BLEND: Brand: XEROFORM

## (undated) DEVICE — TIBURON EXTREMITY SHEET: Brand: CONVERTORS

## (undated) DEVICE — INTENDED FOR TISSUE SEPARATION, AND OTHER PROCEDURES THAT REQUIRE A SHARP SURGICAL BLADE TO PUNCTURE OR CUT.: Brand: BARD-PARKER SAFETY BLADES SIZE 11, STERILE

## (undated) DEVICE — SUT VICRYL 2-0 SH 27 IN UNDYED J417H

## (undated) DEVICE — ACE WRAP 6 IN STERILE

## (undated) DEVICE — SUT VICRYL 4-0 PS-2 18 IN J496G

## (undated) DEVICE — TUBING ARTHROSCOPIC WAVE  MAIN PUMP

## (undated) DEVICE — UNDYED BRAIDED (POLYGLACTIN 910), SYNTHETIC ABSORBABLE SUTURE: Brand: COATED VICRYL

## (undated) DEVICE — ADHESIVE SKIN HIGH VISCOSITY EXOFIN 1ML

## (undated) DEVICE — BANDAGE, ESMARK LF STR 6"X9' (20/CS): Brand: CYPRESS

## (undated) DEVICE — PACK ARTHROSCOPY

## (undated) DEVICE — SUT FIBERSTICK #2 50IN BLUE

## (undated) DEVICE — FABRIC REINFORCED, SURGICAL GOWN, XL: Brand: CONVERTORS

## (undated) DEVICE — INTENDED FOR TISSUE SEPARATION, AND OTHER PROCEDURES THAT REQUIRE A SHARP SURGICAL BLADE TO PUNCTURE OR CUT.: Brand: BARD-PARKER SAFETY BLADES SIZE 15, STERILE

## (undated) DEVICE — SUT FIBERWIRE #2 1/2 CIRCLE T-5 38IN AR-7200

## (undated) DEVICE — GLOVE SRG BIOGEL 6.5

## (undated) DEVICE — SUT TIGERSTICK TIGERWIRE 50IN WHITE/BLACK

## (undated) DEVICE — EMERALD ARTHROSCOPY SHEET: Brand: CONVERTORS

## (undated) DEVICE — GLOVE INDICATOR PI UNDERGLOVE SZ 6.5 BLUE

## (undated) DEVICE — ASTOUND STANDARD SURGICAL GOWN, XL: Brand: CONVERTORS

## (undated) DEVICE — GLOVE SRG BIOGEL 7.5

## (undated) DEVICE — TIBURON SPLIT SHEET: Brand: CONVERTORS

## (undated) DEVICE — ARTHROSCOPY FLOOR MAT

## (undated) DEVICE — GAUZE SPONGES,16 PLY: Brand: CURITY

## (undated) DEVICE — BLADE SHAVER DISSECTOR  5.5MM 13CM COOLCUT

## (undated) DEVICE — CUTTER FLIPCUTTER II SHORT 9MM

## (undated) DEVICE — VAPR PREMIERE50 ELECTRODE WITH HAND CONTROLS 3.0MM, 50 DEGREES SUCTION WITH INTEGRATED HANDPIECE: Brand: VAPR

## (undated) DEVICE — BLADE TENDON STRIPPER 9MM

## (undated) DEVICE — SPONGE LAP 18 X 18 IN

## (undated) DEVICE — BLADE SHAVER TORPEDO CRV 4MM 13MM COOLCUT

## (undated) DEVICE — 10K/24K ARTHROSCOPY INFLOW TUBE SET - DYONICS: Brand: 10K/24K

## (undated) DEVICE — DUAL SPIKE ADAPTER: Brand: CONMED

## (undated) DEVICE — ABDOMINAL PAD: Brand: DERMACEA

## (undated) DEVICE — PACK GENERAL LF